# Patient Record
Sex: FEMALE | HISPANIC OR LATINO | URBAN - METROPOLITAN AREA
[De-identification: names, ages, dates, MRNs, and addresses within clinical notes are randomized per-mention and may not be internally consistent; named-entity substitution may affect disease eponyms.]

---

## 2021-12-18 ENCOUNTER — EMERGENCY (EMERGENCY)
Facility: HOSPITAL | Age: 41
LOS: 1 days | Discharge: HOME | End: 2021-12-18
Attending: EMERGENCY MEDICINE
Payer: MEDICARE

## 2021-12-18 VITALS
SYSTOLIC BLOOD PRESSURE: 149 MMHG | HEIGHT: 60 IN | TEMPERATURE: 98 F | WEIGHT: 186.95 LBS | DIASTOLIC BLOOD PRESSURE: 90 MMHG | RESPIRATION RATE: 20 BRPM | HEART RATE: 66 BPM | OXYGEN SATURATION: 99 %

## 2021-12-18 VITALS
DIASTOLIC BLOOD PRESSURE: 81 MMHG | TEMPERATURE: 98 F | HEART RATE: 59 BPM | OXYGEN SATURATION: 100 % | RESPIRATION RATE: 20 BRPM | SYSTOLIC BLOOD PRESSURE: 129 MMHG

## 2021-12-18 LAB
ALBUMIN SERPL ELPH-MCNC: 3.6 G/DL — SIGNIFICANT CHANGE UP (ref 3.5–5)
ALP SERPL-CCNC: 68 U/L — SIGNIFICANT CHANGE UP (ref 40–120)
ALT FLD-CCNC: 28 U/L DA — SIGNIFICANT CHANGE UP (ref 10–60)
ANION GAP SERPL CALC-SCNC: 5 MMOL/L — SIGNIFICANT CHANGE UP (ref 5–17)
APPEARANCE UR: CLEAR — SIGNIFICANT CHANGE UP
AST SERPL-CCNC: 18 U/L — SIGNIFICANT CHANGE UP (ref 10–40)
BACTERIA # UR AUTO: ABNORMAL /HPF
BASOPHILS # BLD AUTO: 0.04 K/UL — SIGNIFICANT CHANGE UP (ref 0–0.2)
BASOPHILS NFR BLD AUTO: 0.5 % — SIGNIFICANT CHANGE UP (ref 0–2)
BILIRUB SERPL-MCNC: 0.3 MG/DL — SIGNIFICANT CHANGE UP (ref 0.2–1.2)
BILIRUB UR-MCNC: NEGATIVE — SIGNIFICANT CHANGE UP
BUN SERPL-MCNC: 15 MG/DL — SIGNIFICANT CHANGE UP (ref 7–18)
CALCIUM SERPL-MCNC: 9.1 MG/DL — SIGNIFICANT CHANGE UP (ref 8.4–10.5)
CHLORIDE SERPL-SCNC: 108 MMOL/L — SIGNIFICANT CHANGE UP (ref 96–108)
CO2 SERPL-SCNC: 25 MMOL/L — SIGNIFICANT CHANGE UP (ref 22–31)
COLOR SPEC: YELLOW — SIGNIFICANT CHANGE UP
CREAT SERPL-MCNC: 0.72 MG/DL — SIGNIFICANT CHANGE UP (ref 0.5–1.3)
DIFF PNL FLD: ABNORMAL
EOSINOPHIL # BLD AUTO: 0.11 K/UL — SIGNIFICANT CHANGE UP (ref 0–0.5)
EOSINOPHIL NFR BLD AUTO: 1.3 % — SIGNIFICANT CHANGE UP (ref 0–6)
EPI CELLS # UR: SIGNIFICANT CHANGE UP /HPF
GLUCOSE SERPL-MCNC: 88 MG/DL — SIGNIFICANT CHANGE UP (ref 70–99)
GLUCOSE UR QL: NEGATIVE — SIGNIFICANT CHANGE UP
HCG SERPL-ACNC: <1 MIU/ML — SIGNIFICANT CHANGE UP
HCG UR QL: NEGATIVE — SIGNIFICANT CHANGE UP
HCT VFR BLD CALC: 40.3 % — SIGNIFICANT CHANGE UP (ref 34.5–45)
HGB BLD-MCNC: 12.8 G/DL — SIGNIFICANT CHANGE UP (ref 11.5–15.5)
IMM GRANULOCYTES NFR BLD AUTO: 0.2 % — SIGNIFICANT CHANGE UP (ref 0–1.5)
KETONES UR-MCNC: NEGATIVE — SIGNIFICANT CHANGE UP
LEUKOCYTE ESTERASE UR-ACNC: ABNORMAL
LIDOCAIN IGE QN: 136 U/L — SIGNIFICANT CHANGE UP (ref 73–393)
LYMPHOCYTES # BLD AUTO: 2.69 K/UL — SIGNIFICANT CHANGE UP (ref 1–3.3)
LYMPHOCYTES # BLD AUTO: 31 % — SIGNIFICANT CHANGE UP (ref 13–44)
MCHC RBC-ENTMCNC: 26.1 PG — LOW (ref 27–34)
MCHC RBC-ENTMCNC: 31.8 GM/DL — LOW (ref 32–36)
MCV RBC AUTO: 82.1 FL — SIGNIFICANT CHANGE UP (ref 80–100)
MONOCYTES # BLD AUTO: 0.61 K/UL — SIGNIFICANT CHANGE UP (ref 0–0.9)
MONOCYTES NFR BLD AUTO: 7 % — SIGNIFICANT CHANGE UP (ref 2–14)
NEUTROPHILS # BLD AUTO: 5.21 K/UL — SIGNIFICANT CHANGE UP (ref 1.8–7.4)
NEUTROPHILS NFR BLD AUTO: 60 % — SIGNIFICANT CHANGE UP (ref 43–77)
NITRITE UR-MCNC: POSITIVE
NRBC # BLD: 0 /100 WBCS — SIGNIFICANT CHANGE UP (ref 0–0)
PH UR: 6 — SIGNIFICANT CHANGE UP (ref 5–8)
PLATELET # BLD AUTO: 244 K/UL — SIGNIFICANT CHANGE UP (ref 150–400)
POTASSIUM SERPL-MCNC: 3.9 MMOL/L — SIGNIFICANT CHANGE UP (ref 3.5–5.3)
POTASSIUM SERPL-SCNC: 3.9 MMOL/L — SIGNIFICANT CHANGE UP (ref 3.5–5.3)
PROT SERPL-MCNC: 8.3 G/DL — SIGNIFICANT CHANGE UP (ref 6–8.3)
PROT UR-MCNC: 15
RBC # BLD: 4.91 M/UL — SIGNIFICANT CHANGE UP (ref 3.8–5.2)
RBC # FLD: 15 % — HIGH (ref 10.3–14.5)
RBC CASTS # UR COMP ASSIST: ABNORMAL /HPF (ref 0–2)
SARS-COV-2 RNA SPEC QL NAA+PROBE: SIGNIFICANT CHANGE UP
SODIUM SERPL-SCNC: 138 MMOL/L — SIGNIFICANT CHANGE UP (ref 135–145)
SP GR SPEC: 1.01 — SIGNIFICANT CHANGE UP (ref 1.01–1.02)
UROBILINOGEN FLD QL: NEGATIVE — SIGNIFICANT CHANGE UP
WBC # BLD: 8.68 K/UL — SIGNIFICANT CHANGE UP (ref 3.8–10.5)
WBC # FLD AUTO: 8.68 K/UL — SIGNIFICANT CHANGE UP (ref 3.8–10.5)
WBC UR QL: SIGNIFICANT CHANGE UP /HPF (ref 0–5)

## 2021-12-18 PROCEDURE — 81001 URINALYSIS AUTO W/SCOPE: CPT

## 2021-12-18 PROCEDURE — 80053 COMPREHEN METABOLIC PANEL: CPT

## 2021-12-18 PROCEDURE — 99284 EMERGENCY DEPT VISIT MOD MDM: CPT | Mod: 25

## 2021-12-18 PROCEDURE — 83690 ASSAY OF LIPASE: CPT

## 2021-12-18 PROCEDURE — 81025 URINE PREGNANCY TEST: CPT

## 2021-12-18 PROCEDURE — 70498 CT ANGIOGRAPHY NECK: CPT | Mod: 26,MA

## 2021-12-18 PROCEDURE — 70496 CT ANGIOGRAPHY HEAD: CPT | Mod: 26,MA

## 2021-12-18 PROCEDURE — 70498 CT ANGIOGRAPHY NECK: CPT | Mod: MA

## 2021-12-18 PROCEDURE — 99285 EMERGENCY DEPT VISIT HI MDM: CPT

## 2021-12-18 PROCEDURE — 84702 CHORIONIC GONADOTROPIN TEST: CPT

## 2021-12-18 PROCEDURE — 70496 CT ANGIOGRAPHY HEAD: CPT | Mod: MA

## 2021-12-18 PROCEDURE — 85025 COMPLETE CBC W/AUTO DIFF WBC: CPT

## 2021-12-18 PROCEDURE — 87635 SARS-COV-2 COVID-19 AMP PRB: CPT

## 2021-12-18 PROCEDURE — 36415 COLL VENOUS BLD VENIPUNCTURE: CPT

## 2021-12-18 PROCEDURE — 96374 THER/PROPH/DIAG INJ IV PUSH: CPT | Mod: XU

## 2021-12-18 RX ORDER — SODIUM CHLORIDE 9 MG/ML
1000 INJECTION INTRAMUSCULAR; INTRAVENOUS; SUBCUTANEOUS ONCE
Refills: 0 | Status: COMPLETED | OUTPATIENT
Start: 2021-12-18 | End: 2021-12-18

## 2021-12-18 RX ORDER — KETOROLAC TROMETHAMINE 30 MG/ML
30 SYRINGE (ML) INJECTION ONCE
Refills: 0 | Status: DISCONTINUED | OUTPATIENT
Start: 2021-12-18 | End: 2021-12-18

## 2021-12-18 RX ADMIN — Medication 30 MILLIGRAM(S): at 16:57

## 2021-12-18 RX ADMIN — Medication 60 MILLIGRAM(S): at 16:57

## 2021-12-18 RX ADMIN — Medication 30 MILLIGRAM(S): at 12:35

## 2021-12-18 RX ADMIN — SODIUM CHLORIDE 1000 MILLILITER(S): 9 INJECTION INTRAMUSCULAR; INTRAVENOUS; SUBCUTANEOUS at 12:35

## 2021-12-18 NOTE — ED PROVIDER NOTE - PATIENT PORTAL LINK FT
You can access the FollowMyHealth Patient Portal offered by [facility] by registering at the following website: [email or web address]. By joining [facility]’s FollowMyHealth portal, you will also be able to view your health information using other applications (apps) compatible with our system.

## 2021-12-18 NOTE — ED PROVIDER NOTE - OBJECTIVE STATEMENT
40 y/o female patient reports to the ED presenting with right facial swelling and pain. Onset was reportedly after waking up and swelling was noticed by colleagues at work. Pain is associated with right-sided neck pain and headache. Pt received two vaccines for COVID in October. Pt denies any weakness, numbness or visual changes. NKDA.

## 2021-12-18 NOTE — ED PROVIDER NOTE - NSFOLLOWUPINSTRUCTIONS_ED_ALL_ED_FT
Parálisis de Logan    LO QUE NECESITA SABER:    ¿Qué es la parálisis de Logan?La parálisis de Logan es un debilitamiento o parálisis que se presenta súbitamente en un lado de la alen. La parálisis ocurre cuando el nervio que controla los músculos del demetrio se inflama o se irrita.    ¿Cuáles son las causas de la parálisis de Logan?No se sabe con certeza cuál es la causa de la inflamación del nervio facial que conduce a la parálisis de Logan. Es posible que el virus del herpes simple cause la inflamación del nervio. Los virus que provocan los resfríos y la gripe pueden aumentar el riesgo de que tenga parálisis de Logan. El riesgo también es mayor si está embarazada o tiene diabetes.    ¿Cuáles son los signos y síntomas de la parálisis de Logan?Es posible que sienta dolor alrededor de un oído 1 o 2 días antes de presentar otros síntomas. Unos días más tarde puede experimentar debilidad en el mismo lado del demetrio donde sintió el dolor de oído. A menudo los síntomas empeoran a lo misty de los siguientes nomi. La parálisis de Logan por lo general dura entre 2 y 3 semanas, aunque puede durar varios meses. Puede presentar cualquiera de los siguientes signos o síntomas:   •Debilidad o parálisis en un lado del demetrio      •Caída de un lado del demetrio      •Dificultad para cerrar el chuyita del lado afectado del demetrio      •Mayor sensibilidad con la audición en el lado afectado      •Adormecimiento o dolor en el oído, la lengua o la alen      •Johan producción de lágrimas y saliva      •Pérdida del gusto con la parte delantera de la lengua      •Dificultad para masticar los alimentos      ¿Cómo se diagnostica la parálisis de Logan?Walker médico examinará cómo mueve las distintas partes del demetrio. Es posible que también le arjun piper electromiografía o EMG. Esta prueba mide la actividad eléctrica de los músculos. Piper EMG también examina los nervios que controlan los músculos.    ¿Cómo se trata la parálisis de Logan?La parálisis de Logan a menudo desaparece sin necesidad de tratamiento. Algunos tratamientos pueden ayudarle a mejorar más rápido o evitar otros problemas que pueden ser el resultado de la parálisis de Logan.   •Cuidado del ojopodría ser necesario si le debby parpadear o cerrar el chuyita. Use lágrimas artificiales alvaro el día para mantener el chuyita humectado. El médico podría recomendarle que se aplique un ungüento en el chuyita alvaro la noche. Es posible que deba además cubrirse el chuyita con un parche o con cinta para mantenerlo cerrado mientras duerme. Schubert ayudará a que no se seque ni se infecte. Use lentes oscuros para proteger el chuyita timothy solar directa. Evite los sitios donde hay vapores, polvo u otras partículas en el aire que podrían lastimarle el chuyita.      •Es posible que le receten un medicamentopara bajar la inflamación y calmar la irritación del nervio facial. Walker médico podría recetarle un medicamento antiviral si joe que la parálisis de Logan es causada por un virus. Walker médico podría sugerirle que tome acetaminofén o ibuprofeno para aliviar el dolor. Estos medicamentos están disponibles sin receta médica. Pregunte cuál debería karan y qué dosis necesita. Siga las indicaciones. El acetaminofén puede dañar el hígado y el ibuprofeno puede causar sangrado estomacal o daño en los riñones.      •La fisioterapiapodría ser recomendado por walker médico. Un fisioterapeuta puede enseñarle a masajearse el demetrio y ejercitar los nervios y los músculos faciales. Es posible que esto contribuya a que se mejore más rápidamente y a evitar que tenga problemas a misty plazo. Usted podrá hacer los ejercicios por walker cuenta cuando comience a recobrar el movimiento en el demetrio. Yash y cierre el chuyita, guíñelo y sonríase de oreja a oreja. Arjun los ejercicios alvaro 15 o 20 minutos varias veces al día.      ¿Cuándo sergey comunicarme con mi médico?  •Tiene fiebre.      •El chuyita se le pone cobos, se le irrita o le duele.      •Usted tiene preguntas o inquietudes acerca de walker condición o cuidado.      ¿Cuándo sergey buscar atención inmediata o llamar al 911?  •Le surge debilidad o pierde la sensación en un lado del cuerpo (aparte del demetrio).      •Ve doble o pierde la vista en un chuyita.      •Usted tiene dificultad para pensar con claridad.      ACUERDOS SOBRE WALKER CUIDADO:    Usted tiene el derecho de ayudar a planear walker cuidado. Aprenda todo lo que pueda sobre walker condición y david darle tratamiento. Discuta marce opciones de tratamiento con marce médicos para decidir el cuidado que usted desea recibir. Usted siempre tiene el derecho de rechazar el tratamiento.       © Copyright [employer] 2021

## 2021-12-18 NOTE — ED ADULT TRIAGE NOTE - CHIEF COMPLAINT QUOTE
as per pt " she woke up with facial swelling this am " as per pt " she woke up with right  facial swelling this am "

## 2021-12-18 NOTE — ED PROVIDER NOTE - CLINICAL SUMMARY MEDICAL DECISION MAKING FREE TEXT BOX
42 y/o patient presenting with right facial swelling. Suspicion for Bell's Palsy. Will perform CT imaging of the head, lab work and reassess. 40 y/o patient presenting with subjective right facial swelling and droop earlier including rt eyebrow. Currently normal neuro exam. Suspicion for Bell's Palsy. Will perform CT imaging of the head, lab work and reassess.

## 2022-07-09 NOTE — ED ADULT TRIAGE NOTE - STATUS:
Applied FAMILY HISTORY:  Grandparent  Still living? No  FH: diabetes mellitus, Age at diagnosis: Age Unknown